# Patient Record
Sex: MALE | Race: WHITE | NOT HISPANIC OR LATINO | Employment: UNEMPLOYED | URBAN - METROPOLITAN AREA
[De-identification: names, ages, dates, MRNs, and addresses within clinical notes are randomized per-mention and may not be internally consistent; named-entity substitution may affect disease eponyms.]

---

## 2017-03-06 ENCOUNTER — HISTORICAL (OUTPATIENT)
Dept: LAB | Facility: HOSPITAL | Age: 52
End: 2017-03-06

## 2017-03-22 ENCOUNTER — HISTORICAL (OUTPATIENT)
Dept: LAB | Facility: HOSPITAL | Age: 52
End: 2017-03-22

## 2017-04-07 ENCOUNTER — HISTORICAL (OUTPATIENT)
Dept: RADIOLOGY | Facility: HOSPITAL | Age: 52
End: 2017-04-07

## 2017-04-21 ENCOUNTER — HISTORICAL (OUTPATIENT)
Dept: LAB | Facility: HOSPITAL | Age: 52
End: 2017-04-21

## 2017-04-24 ENCOUNTER — HISTORICAL (OUTPATIENT)
Dept: RADIOLOGY | Facility: HOSPITAL | Age: 52
End: 2017-04-24

## 2017-05-12 ENCOUNTER — HISTORICAL (OUTPATIENT)
Dept: RADIOLOGY | Facility: HOSPITAL | Age: 52
End: 2017-05-12

## 2017-05-12 LAB
ABS NEUT (OLG): 5 X10(3)/MCL (ref 2.1–9.2)
BASOPHILS # BLD AUTO: 0.1 X10(3)/MCL
BASOPHILS NFR BLD AUTO: 1 % (ref 0–2)
EOSINOPHIL # BLD AUTO: 0.5 X10(3)/MCL
EOSINOPHIL NFR BLD AUTO: 6 %
ERYTHROCYTE [DISTWIDTH] IN BLOOD BY AUTOMATED COUNT: 13.1 % (ref 11.5–17)
HCT VFR BLD AUTO: 39.6 % (ref 42–52)
HGB BLD-MCNC: 13.2 GM/DL (ref 14–18)
LYMPHOCYTES # BLD AUTO: 2.2 X10(3)/MCL
LYMPHOCYTES NFR BLD AUTO: 25 % (ref 13–40)
MCH RBC QN AUTO: 29.5 PG (ref 27–31)
MCHC RBC AUTO-ENTMCNC: 33.3 GM/DL (ref 33–36)
MCV RBC AUTO: 88.4 FL (ref 80–94)
MONOCYTES # BLD AUTO: 0.9 X10(3)/MCL
MONOCYTES NFR BLD AUTO: 10 % (ref 2–11)
NEUTROPHILS # BLD AUTO: 5 X10(3)/MCL (ref 2.1–9.2)
NEUTROPHILS NFR BLD AUTO: 57 % (ref 47–80)
PLATELET # BLD AUTO: 148 X10(3)/MCL (ref 130–400)
PMV BLD AUTO: 10.1 FL (ref 7.4–10.4)
RBC # BLD AUTO: 4.48 X10(6)/MCL (ref 4.7–6.1)
WBC # SPEC AUTO: 8.8 X10(3)/MCL (ref 4.5–11.5)

## 2017-07-13 ENCOUNTER — HISTORICAL (OUTPATIENT)
Dept: LAB | Facility: HOSPITAL | Age: 52
End: 2017-07-13

## 2017-07-13 LAB
BUN SERPL-MCNC: 19 MG/DL (ref 7–18)
CALCIUM SERPL-MCNC: 9.6 MG/DL (ref 8.5–10.1)
CHLORIDE SERPL-SCNC: 106 MMOL/L (ref 98–107)
CO2 SERPL-SCNC: 28.2 MMOL/L (ref 21–32)
CREAT SERPL-MCNC: 1.04 MG/DL (ref 0.6–1.3)
GLUCOSE SERPL-MCNC: 116 MG/DL (ref 74–106)
POTASSIUM SERPL-SCNC: 4.5 MMOL/L (ref 3.5–5.1)
SODIUM SERPL-SCNC: 143 MMOL/L (ref 136–145)

## 2017-07-28 ENCOUNTER — HISTORICAL (OUTPATIENT)
Dept: LAB | Facility: HOSPITAL | Age: 52
End: 2017-07-28

## 2017-07-28 ENCOUNTER — HISTORICAL (OUTPATIENT)
Dept: RESPIRATORY THERAPY | Facility: HOSPITAL | Age: 52
End: 2017-07-28

## 2017-07-28 LAB
ABS NEUT (OLG): 6.1 X10(3)/MCL (ref 2.1–9.2)
BASOPHILS # BLD AUTO: 0.1 X10(3)/MCL
BASOPHILS NFR BLD AUTO: 1 % (ref 0–2)
EOSINOPHIL # BLD AUTO: 0.5 X10(3)/MCL
EOSINOPHIL NFR BLD AUTO: 5 %
ERYTHROCYTE [DISTWIDTH] IN BLOOD BY AUTOMATED COUNT: 13.2 % (ref 11.5–17)
HCT VFR BLD AUTO: 40.8 % (ref 42–52)
HGB BLD-MCNC: 13.5 GM/DL (ref 14–18)
LYMPHOCYTES # BLD AUTO: 2.7 X10(3)/MCL
LYMPHOCYTES NFR BLD AUTO: 26 % (ref 13–40)
MCH RBC QN AUTO: 28.2 PG (ref 27–31)
MCHC RBC AUTO-ENTMCNC: 33.1 GM/DL (ref 33–36)
MCV RBC AUTO: 85.3 FL (ref 80–94)
MONOCYTES # BLD AUTO: 1.1 X10(3)/MCL
MONOCYTES NFR BLD AUTO: 10 % (ref 2–11)
NEUTROPHILS # BLD AUTO: 6.1 X10(3)/MCL (ref 2.1–9.2)
NEUTROPHILS NFR BLD AUTO: 58 % (ref 47–80)
PLATELET # BLD AUTO: 204 X10(3)/MCL (ref 130–400)
PMV BLD AUTO: 9.7 FL (ref 7.4–10.4)
RBC # BLD AUTO: 4.79 X10(6)/MCL (ref 4.7–6.1)
WBC # SPEC AUTO: 10.5 X10(3)/MCL (ref 4.5–11.5)

## 2017-09-20 ENCOUNTER — HISTORICAL (OUTPATIENT)
Dept: RESPIRATORY THERAPY | Facility: HOSPITAL | Age: 52
End: 2017-09-20

## 2017-11-08 ENCOUNTER — HISTORICAL (OUTPATIENT)
Dept: LAB | Facility: HOSPITAL | Age: 52
End: 2017-11-08

## 2017-11-08 LAB — D DIMER PPP IA.FEU-MCNC: 0.56 MG/L (ref 0.19–0.5)

## 2017-11-10 ENCOUNTER — HISTORICAL (OUTPATIENT)
Dept: RADIOLOGY | Facility: HOSPITAL | Age: 52
End: 2017-11-10

## 2018-03-09 ENCOUNTER — HISTORICAL (OUTPATIENT)
Dept: INTERNAL MEDICINE | Facility: CLINIC | Age: 53
End: 2018-03-09

## 2018-03-11 LAB
FINAL CULTURE: NORMAL
GRAM STN SPEC: NORMAL

## 2018-04-05 ENCOUNTER — HISTORICAL (OUTPATIENT)
Dept: LAB | Facility: HOSPITAL | Age: 53
End: 2018-04-05

## 2018-04-26 ENCOUNTER — HISTORICAL (OUTPATIENT)
Dept: INTERNAL MEDICINE | Facility: CLINIC | Age: 53
End: 2018-04-26

## 2018-05-04 ENCOUNTER — HISTORICAL (OUTPATIENT)
Dept: RADIOLOGY | Facility: HOSPITAL | Age: 53
End: 2018-05-04

## 2018-05-07 ENCOUNTER — HISTORICAL (OUTPATIENT)
Dept: LAB | Facility: HOSPITAL | Age: 53
End: 2018-05-07

## 2018-05-07 LAB
ALBUMIN SERPL-MCNC: 3.9 GM/DL (ref 3.4–5)
ALP SERPL-CCNC: 61 UNIT/L (ref 46–116)
ALT SERPL-CCNC: 72 UNIT/L (ref 12–78)
AST SERPL-CCNC: 22 UNIT/L (ref 15–37)
BILIRUB SERPL-MCNC: 1.2 MG/DL (ref 0.2–1)
BILIRUBIN DIRECT+TOT PNL SERPL-MCNC: 0.24 MG/DL (ref 0–0.2)
BILIRUBIN DIRECT+TOT PNL SERPL-MCNC: 0.96 MG/DL (ref 0–0.8)
CHOLEST SERPL-MCNC: 129 MG/DL (ref 0–200)
CHOLEST/HDLC SERPL: 3 {RATIO} (ref 0–5)
HDLC SERPL-MCNC: 43 MG/DL (ref 40–60)
LDLC SERPL CALC-MCNC: 64 MG/DL (ref 0–129)
PROT SERPL-MCNC: 7.1 GM/DL (ref 6.4–8.2)
TRIGL SERPL-MCNC: 108 MG/DL
VLDLC SERPL CALC-MCNC: 22 MG/DL

## 2018-05-14 ENCOUNTER — HISTORICAL (OUTPATIENT)
Dept: PHYSICAL THERAPY | Facility: HOSPITAL | Age: 53
End: 2018-05-14

## 2018-06-05 ENCOUNTER — HISTORICAL (OUTPATIENT)
Dept: PHYSICAL THERAPY | Facility: HOSPITAL | Age: 53
End: 2018-06-05

## 2018-06-08 ENCOUNTER — HISTORICAL (OUTPATIENT)
Dept: PHYSICAL THERAPY | Facility: HOSPITAL | Age: 53
End: 2018-06-08

## 2018-06-08 ENCOUNTER — HISTORICAL (OUTPATIENT)
Dept: RADIOLOGY | Facility: HOSPITAL | Age: 53
End: 2018-06-08

## 2018-06-12 ENCOUNTER — HISTORICAL (OUTPATIENT)
Dept: PHYSICAL THERAPY | Facility: HOSPITAL | Age: 53
End: 2018-06-12

## 2018-06-14 ENCOUNTER — HISTORICAL (OUTPATIENT)
Dept: PHYSICAL THERAPY | Facility: HOSPITAL | Age: 53
End: 2018-06-14

## 2018-06-19 ENCOUNTER — HISTORICAL (OUTPATIENT)
Dept: PHYSICAL THERAPY | Facility: HOSPITAL | Age: 53
End: 2018-06-19

## 2018-06-20 ENCOUNTER — HISTORICAL (OUTPATIENT)
Dept: RADIOLOGY | Facility: HOSPITAL | Age: 53
End: 2018-06-20

## 2018-06-21 ENCOUNTER — HISTORICAL (OUTPATIENT)
Dept: PHYSICAL THERAPY | Facility: HOSPITAL | Age: 53
End: 2018-06-21

## 2018-06-22 LAB
COLOR STL: NORMAL
CONSISTENCY STL: NORMAL
HEMOCCULT SP1 STL QL: NEGATIVE

## 2018-06-23 LAB
COLOR STL: NORMAL
CONSISTENCY STL: NORMAL
HEMOCCULT SP2 STL QL: NEGATIVE

## 2018-06-24 LAB
COLOR STL: NORMAL
CONSISTENCY STL: NORMAL

## 2018-06-25 ENCOUNTER — HISTORICAL (OUTPATIENT)
Dept: LAB | Facility: HOSPITAL | Age: 53
End: 2018-06-25

## 2018-06-26 ENCOUNTER — HISTORICAL (OUTPATIENT)
Dept: PHYSICAL THERAPY | Facility: HOSPITAL | Age: 53
End: 2018-06-26

## 2018-06-27 ENCOUNTER — HISTORICAL (OUTPATIENT)
Dept: WOUND CARE | Facility: HOSPITAL | Age: 53
End: 2018-06-27

## 2018-06-29 ENCOUNTER — HISTORICAL (OUTPATIENT)
Dept: WOUND CARE | Facility: HOSPITAL | Age: 53
End: 2018-06-29

## 2018-07-03 ENCOUNTER — HISTORICAL (OUTPATIENT)
Dept: WOUND CARE | Facility: HOSPITAL | Age: 53
End: 2018-07-03

## 2018-07-10 ENCOUNTER — HISTORICAL (OUTPATIENT)
Dept: LAB | Facility: HOSPITAL | Age: 53
End: 2018-07-10

## 2018-07-10 ENCOUNTER — HISTORICAL (OUTPATIENT)
Dept: WOUND CARE | Facility: HOSPITAL | Age: 53
End: 2018-07-10

## 2018-07-10 LAB
BUN SERPL-MCNC: 15.8 MG/DL (ref 7–18)
CALCIUM SERPL-MCNC: 9 MG/DL (ref 8.5–10.1)
CHLORIDE SERPL-SCNC: 107 MMOL/L (ref 98–107)
CO2 SERPL-SCNC: 28.1 MMOL/L (ref 21–32)
CREAT SERPL-MCNC: 1.18 MG/DL (ref 0.6–1.3)
CREAT/UREA NIT SERPL: 13
ERYTHROCYTE [DISTWIDTH] IN BLOOD BY AUTOMATED COUNT: 13.3 % (ref 11.5–17)
GLUCOSE SERPL-MCNC: 103 MG/DL (ref 74–106)
HCT VFR BLD AUTO: 37.8 % (ref 42–52)
HGB BLD-MCNC: 12.8 GM/DL (ref 14–18)
MCH RBC QN AUTO: 29.4 PG (ref 27–31)
MCHC RBC AUTO-ENTMCNC: 33.8 GM/DL (ref 33–36)
MCV RBC AUTO: 87.2 FL (ref 80–94)
PLATELET # BLD AUTO: 153 X10(3)/MCL (ref 130–400)
PMV BLD AUTO: 10.1 FL (ref 7.4–10.4)
POTASSIUM SERPL-SCNC: 4.1 MMOL/L (ref 3.5–5.1)
RBC # BLD AUTO: 4.34 X10(6)/MCL (ref 4.7–6.1)
SODIUM SERPL-SCNC: 141 MMOL/L (ref 136–145)
WBC # SPEC AUTO: 8.1 X10(3)/MCL (ref 4.5–11.5)

## 2018-07-12 ENCOUNTER — HISTORICAL (OUTPATIENT)
Dept: WOUND CARE | Facility: HOSPITAL | Age: 53
End: 2018-07-12

## 2018-07-17 ENCOUNTER — HISTORICAL (OUTPATIENT)
Dept: CARDIOLOGY | Facility: HOSPITAL | Age: 53
End: 2018-07-17

## 2018-07-17 LAB
APTT PPP: 32.3 SECOND(S) (ref 24.8–36.9)
INR PPP: 1.02 (ref 0–1.27)
PROTHROMBIN TIME: 13.7 SECOND(S) (ref 12.2–14.7)

## 2018-07-19 ENCOUNTER — HISTORICAL (OUTPATIENT)
Dept: WOUND CARE | Facility: HOSPITAL | Age: 53
End: 2018-07-19

## 2018-07-26 ENCOUNTER — HISTORICAL (OUTPATIENT)
Dept: WOUND CARE | Facility: HOSPITAL | Age: 53
End: 2018-07-26

## 2018-07-30 ENCOUNTER — HISTORICAL (OUTPATIENT)
Dept: RADIOLOGY | Facility: HOSPITAL | Age: 53
End: 2018-07-30

## 2018-08-02 ENCOUNTER — HISTORICAL (OUTPATIENT)
Dept: WOUND CARE | Facility: HOSPITAL | Age: 53
End: 2018-08-02

## 2018-08-09 ENCOUNTER — HISTORICAL (OUTPATIENT)
Dept: WOUND CARE | Facility: HOSPITAL | Age: 53
End: 2018-08-09

## 2018-08-16 ENCOUNTER — HISTORICAL (OUTPATIENT)
Dept: WOUND CARE | Facility: HOSPITAL | Age: 53
End: 2018-08-16

## 2018-08-22 ENCOUNTER — HISTORICAL (OUTPATIENT)
Dept: PHYSICAL THERAPY | Facility: HOSPITAL | Age: 53
End: 2018-08-22

## 2018-08-23 ENCOUNTER — HISTORICAL (OUTPATIENT)
Dept: WOUND CARE | Facility: HOSPITAL | Age: 53
End: 2018-08-23

## 2018-09-04 ENCOUNTER — HISTORICAL (OUTPATIENT)
Dept: PHYSICAL THERAPY | Facility: HOSPITAL | Age: 53
End: 2018-09-04

## 2018-09-06 ENCOUNTER — HISTORICAL (OUTPATIENT)
Dept: PHYSICAL THERAPY | Facility: HOSPITAL | Age: 53
End: 2018-09-06

## 2018-09-11 ENCOUNTER — HISTORICAL (OUTPATIENT)
Dept: PHYSICAL THERAPY | Facility: HOSPITAL | Age: 53
End: 2018-09-11

## 2018-12-04 ENCOUNTER — HISTORICAL (OUTPATIENT)
Dept: RADIOLOGY | Facility: HOSPITAL | Age: 53
End: 2018-12-04

## 2018-12-04 LAB
ALBUMIN SERPL-MCNC: 3.6 GM/DL (ref 3.4–5)
ALBUMIN/GLOB SERPL: 1.1 RATIO (ref 1.1–2)
ALP SERPL-CCNC: 54 UNIT/L (ref 46–116)
ALT SERPL-CCNC: 36 UNIT/L (ref 12–78)
AST SERPL-CCNC: 19 UNIT/L (ref 15–37)
BILIRUB SERPL-MCNC: 0.9 MG/DL (ref 0.2–1)
BILIRUBIN DIRECT+TOT PNL SERPL-MCNC: 0.16 MG/DL (ref 0–0.2)
BILIRUBIN DIRECT+TOT PNL SERPL-MCNC: 0.74 MG/DL (ref 0–0.8)
BUN SERPL-MCNC: 15.6 MG/DL (ref 7–18)
CALCIUM SERPL-MCNC: 9 MG/DL (ref 8.5–10.1)
CHLORIDE SERPL-SCNC: 107 MMOL/L (ref 98–107)
CHOLEST SERPL-MCNC: 265 MG/DL (ref 0–200)
CHOLEST/HDLC SERPL: 5.5 {RATIO} (ref 0–5)
CO2 SERPL-SCNC: 26.6 MMOL/L (ref 21–32)
CREAT SERPL-MCNC: 1.13 MG/DL (ref 0.6–1.3)
GLOBULIN SER-MCNC: 3.2 GM/DL (ref 2.4–3.5)
GLUCOSE SERPL-MCNC: 105 MG/DL (ref 74–106)
HDLC SERPL-MCNC: 48 MG/DL (ref 40–60)
LDLC SERPL CALC-MCNC: 188 MG/DL (ref 0–129)
POTASSIUM SERPL-SCNC: 4.1 MMOL/L (ref 3.5–5.1)
PROT SERPL-MCNC: 6.8 GM/DL (ref 6.4–8.2)
SODIUM SERPL-SCNC: 142 MMOL/L (ref 136–145)
TRIGL SERPL-MCNC: 147 MG/DL
URATE SERPL-MCNC: 7 MG/DL (ref 3.4–7)
VLDLC SERPL CALC-MCNC: 29 MG/DL

## 2019-01-08 ENCOUNTER — HISTORICAL (OUTPATIENT)
Dept: LAB | Facility: HOSPITAL | Age: 54
End: 2019-01-08

## 2019-01-08 LAB
ABS NEUT (OLG): 5.02 X10(3)/MCL (ref 2.1–9.2)
BASOPHILS # BLD AUTO: 0.06 X10(3)/MCL
BASOPHILS NFR BLD AUTO: 1 %
EOSINOPHIL # BLD AUTO: 0.34 X10(3)/MCL
EOSINOPHIL NFR BLD AUTO: 4 %
ERYTHROCYTE [DISTWIDTH] IN BLOOD BY AUTOMATED COUNT: 13.2 % (ref 11.5–14.5)
HCT VFR BLD AUTO: 41.2 % (ref 40–51)
HGB BLD-MCNC: 13.8 GM/DL (ref 13.5–17.5)
HIV 1+2 AB+HIV1 P24 AG SERPL QL IA: NONREACTIVE
IMM GRANULOCYTES # BLD AUTO: 0.05 10*3/UL
IMM GRANULOCYTES NFR BLD AUTO: 1 %
LYMPHOCYTES # BLD AUTO: 2.03 X10(3)/MCL
LYMPHOCYTES NFR BLD AUTO: 25 % (ref 13–40)
MCH RBC QN AUTO: 29.2 PG (ref 26–34)
MCHC RBC AUTO-ENTMCNC: 33.5 GM/DL (ref 31–37)
MCV RBC AUTO: 87.1 FL (ref 80–100)
MONOCYTES # BLD AUTO: 0.75 X10(3)/MCL
MONOCYTES NFR BLD AUTO: 9 % (ref 4–12)
NEUTROPHILS # BLD AUTO: 5.02 X10(3)/MCL
NEUTROPHILS NFR BLD AUTO: 61 X10(3)/MCL
PLATELET # BLD AUTO: 165 X10(3)/MCL (ref 130–400)
PMV BLD AUTO: 11.3 FL (ref 7.4–10.4)
RBC # BLD AUTO: 4.73 X10(6)/MCL (ref 4.5–5.9)
WBC # SPEC AUTO: 8.2 X10(3)/MCL (ref 4.5–11)

## 2019-02-07 ENCOUNTER — HISTORICAL (OUTPATIENT)
Dept: RADIOLOGY | Facility: HOSPITAL | Age: 54
End: 2019-02-07

## 2019-02-14 ENCOUNTER — HISTORICAL (OUTPATIENT)
Dept: LAB | Facility: HOSPITAL | Age: 54
End: 2019-02-14

## 2019-02-14 LAB
CRP SERPL-MCNC: <0.2 MG/DL (ref 0–0.9)
ERYTHROCYTE [SEDIMENTATION RATE] IN BLOOD: 3 MM/HR (ref 0–15)
RHEUMATOID FACT SERPL-ACNC: <10 IU/ML (ref 0–15)

## 2019-03-20 ENCOUNTER — HISTORICAL (OUTPATIENT)
Dept: LAB | Facility: HOSPITAL | Age: 54
End: 2019-03-20

## 2019-03-20 LAB
ALBUMIN SERPL-MCNC: 3.7 GM/DL (ref 3.4–5)
ALP SERPL-CCNC: 56 UNIT/L (ref 46–116)
ALT SERPL-CCNC: 36 UNIT/L (ref 12–78)
AST SERPL-CCNC: 15 UNIT/L (ref 15–37)
BILIRUB SERPL-MCNC: 0.6 MG/DL (ref 0.2–1)
BILIRUBIN DIRECT+TOT PNL SERPL-MCNC: 0.11 MG/DL (ref 0–0.2)
BILIRUBIN DIRECT+TOT PNL SERPL-MCNC: 0.49 MG/DL (ref 0–0.8)
CHOLEST SERPL-MCNC: 263 MG/DL (ref 0–200)
CHOLEST/HDLC SERPL: 6.7 {RATIO} (ref 0–5)
HDLC SERPL-MCNC: 39 MG/DL (ref 40–60)
LDLC SERPL CALC-MCNC: 169 MG/DL (ref 0–129)
PROT SERPL-MCNC: 6.9 GM/DL (ref 6.4–8.2)
TRIGL SERPL-MCNC: 273 MG/DL
VLDLC SERPL CALC-MCNC: 55 MG/DL

## 2019-04-28 ENCOUNTER — HISTORICAL (OUTPATIENT)
Dept: SLEEP MEDICINE | Facility: HOSPITAL | Age: 54
End: 2019-04-28

## 2019-05-18 ENCOUNTER — HISTORICAL (OUTPATIENT)
Dept: SLEEP MEDICINE | Facility: HOSPITAL | Age: 54
End: 2019-05-18

## 2019-08-08 ENCOUNTER — HISTORICAL (OUTPATIENT)
Dept: LAB | Facility: HOSPITAL | Age: 54
End: 2019-08-08

## 2019-08-08 LAB
ALBUMIN SERPL-MCNC: 3.9 GM/DL (ref 3.4–5)
ALP SERPL-CCNC: 60 UNIT/L (ref 46–116)
ALT SERPL-CCNC: 38 UNIT/L (ref 12–78)
AST SERPL-CCNC: 19 UNIT/L (ref 15–37)
BILIRUB SERPL-MCNC: 0.7 MG/DL (ref 0.2–1)
BILIRUBIN DIRECT+TOT PNL SERPL-MCNC: 0.11 MG/DL (ref 0–0.2)
BILIRUBIN DIRECT+TOT PNL SERPL-MCNC: 0.59 MG/DL (ref 0–0.8)
CHOLEST SERPL-MCNC: 150 MG/DL (ref 0–200)
CHOLEST/HDLC SERPL: 3.3 {RATIO} (ref 0–5)
HDLC SERPL-MCNC: 46 MG/DL (ref 40–60)
LDLC SERPL CALC-MCNC: 66 MG/DL (ref 0–129)
PROT SERPL-MCNC: 7.2 GM/DL (ref 6.4–8.2)
TRIGL SERPL-MCNC: 190 MG/DL
VLDLC SERPL CALC-MCNC: 38 MG/DL

## 2019-08-21 ENCOUNTER — HISTORICAL (OUTPATIENT)
Dept: RADIOLOGY | Facility: HOSPITAL | Age: 54
End: 2019-08-21

## 2019-11-05 ENCOUNTER — HISTORICAL (OUTPATIENT)
Dept: LAB | Facility: HOSPITAL | Age: 54
End: 2019-11-05

## 2019-11-05 LAB
ALBUMIN SERPL-MCNC: 4 GM/DL (ref 3.4–5)
ALBUMIN/GLOB SERPL: 1.1 RATIO (ref 1.1–2)
ALP SERPL-CCNC: 62 UNIT/L (ref 46–116)
ALT SERPL-CCNC: 33 UNIT/L (ref 12–78)
APPEARANCE, UA: CLEAR
AST SERPL-CCNC: 17 UNIT/L (ref 15–37)
BACTERIA SPEC CULT: NORMAL
BILIRUB SERPL-MCNC: 1.3 MG/DL (ref 0.2–1)
BILIRUB UR QL STRIP: NEGATIVE
BILIRUBIN DIRECT+TOT PNL SERPL-MCNC: 0.23 MG/DL (ref 0–0.2)
BILIRUBIN DIRECT+TOT PNL SERPL-MCNC: 1.07 MG/DL (ref 0–0.8)
BUN SERPL-MCNC: 17.1 MG/DL (ref 7–18)
CALCIUM SERPL-MCNC: 9.3 MG/DL (ref 8.5–10.1)
CHLORIDE SERPL-SCNC: 102 MMOL/L (ref 98–107)
CHOLEST SERPL-MCNC: 298 MG/DL (ref 0–200)
CHOLEST/HDLC SERPL: 7.3 {RATIO} (ref 0–5)
CO2 SERPL-SCNC: 28.4 MMOL/L (ref 21–32)
COLOR UR: YELLOW
CREAT SERPL-MCNC: 1.07 MG/DL (ref 0.6–1.3)
ERYTHROCYTE [DISTWIDTH] IN BLOOD BY AUTOMATED COUNT: 12.8 % (ref 11.5–17)
GLOBULIN SER-MCNC: 3.5 GM/DL (ref 2.4–3.5)
GLUCOSE (UA): NEGATIVE
GLUCOSE SERPL-MCNC: 100 MG/DL (ref 74–106)
HCT VFR BLD AUTO: 43.8 % (ref 42–52)
HDLC SERPL-MCNC: 41 MG/DL (ref 40–60)
HGB BLD-MCNC: 14.3 GM/DL (ref 14–18)
HGB UR QL STRIP: NEGATIVE
KETONES UR QL STRIP: NEGATIVE
LDLC SERPL CALC-MCNC: 181 MG/DL (ref 0–129)
LEUKOCYTE ESTERASE UR QL STRIP: NEGATIVE
MCH RBC QN AUTO: 29.1 PG (ref 27–31)
MCHC RBC AUTO-ENTMCNC: 32.6 GM/DL (ref 33–36)
MCV RBC AUTO: 89 FL (ref 80–94)
NITRITE UR QL STRIP: NEGATIVE
PH UR STRIP: 7 [PH] (ref 5–9)
PLATELET # BLD AUTO: 193 X10(3)/MCL (ref 130–400)
PMV BLD AUTO: 11.2 FL (ref 9.4–12.4)
POTASSIUM SERPL-SCNC: 4.6 MMOL/L (ref 3.5–5.1)
PROT SERPL-MCNC: 7.5 GM/DL (ref 6.4–8.2)
PROT UR QL STRIP: NEGATIVE
PSA SERPL-MCNC: 1.68 NG/ML (ref 0–4)
RBC # BLD AUTO: 4.92 X10(6)/MCL (ref 4.7–6.1)
RBC #/AREA URNS HPF: NORMAL /[HPF]
SODIUM SERPL-SCNC: 139 MMOL/L (ref 136–145)
SP GR UR STRIP: 1.02 (ref 1–1.03)
SQUAMOUS EPITHELIAL, UA: NORMAL
TRIGL SERPL-MCNC: 381 MG/DL
TSH SERPL-ACNC: 0.95 MIU/ML (ref 0.36–3.74)
URATE SERPL-MCNC: 6.3 MG/DL (ref 3.4–7)
UROBILINOGEN UR STRIP-ACNC: 0.2
VLDLC SERPL CALC-MCNC: 76 MG/DL
WBC # SPEC AUTO: 8.1 X10(3)/MCL (ref 4.5–11.5)
WBC #/AREA URNS HPF: NORMAL /[HPF]

## 2020-05-22 ENCOUNTER — HISTORICAL (OUTPATIENT)
Dept: LAB | Facility: HOSPITAL | Age: 55
End: 2020-05-22

## 2020-05-22 LAB
ALBUMIN SERPL-MCNC: 3.9 GM/DL (ref 3.4–5)
ALP SERPL-CCNC: 51 UNIT/L (ref 46–116)
ALT SERPL-CCNC: 28 UNIT/L (ref 12–78)
AST SERPL-CCNC: 16 UNIT/L (ref 15–37)
BILIRUB SERPL-MCNC: 0.7 MG/DL (ref 0.2–1)
BILIRUBIN DIRECT+TOT PNL SERPL-MCNC: 0.18 MG/DL (ref 0–0.2)
BILIRUBIN DIRECT+TOT PNL SERPL-MCNC: 0.52 MG/DL (ref 0–0.8)
CHOLEST SERPL-MCNC: 149 MG/DL (ref 0–200)
CHOLEST/HDLC SERPL: 3.3 {RATIO} (ref 0–5)
HDLC SERPL-MCNC: 45 MG/DL (ref 40–60)
LDLC SERPL CALC-MCNC: 73 MG/DL (ref 0–129)
PROT SERPL-MCNC: 6.8 GM/DL (ref 6.4–8.2)
TRIGL SERPL-MCNC: 156 MG/DL
VLDLC SERPL CALC-MCNC: 31 MG/DL

## 2020-07-06 ENCOUNTER — HISTORICAL (OUTPATIENT)
Dept: LAB | Facility: HOSPITAL | Age: 55
End: 2020-07-06

## 2020-09-16 ENCOUNTER — HISTORICAL (OUTPATIENT)
Dept: LAB | Facility: HOSPITAL | Age: 55
End: 2020-09-16

## 2020-09-16 LAB
ALBUMIN SERPL-MCNC: 3.9 GM/DL (ref 3.4–5)
ALP SERPL-CCNC: 50 UNIT/L (ref 46–116)
ALT SERPL-CCNC: 35 UNIT/L (ref 12–78)
APPEARANCE, UA: CLEAR
AST SERPL-CCNC: 17 UNIT/L (ref 15–37)
BACTERIA SPEC CULT: NORMAL
BILIRUB SERPL-MCNC: 1.2 MG/DL (ref 0.2–1)
BILIRUB UR QL STRIP: NEGATIVE
BILIRUBIN DIRECT+TOT PNL SERPL-MCNC: 0.26 MG/DL (ref 0–0.2)
BILIRUBIN DIRECT+TOT PNL SERPL-MCNC: 0.94 MG/DL (ref 0–0.8)
COLOR UR: YELLOW
GLUCOSE (UA): NEGATIVE
HCV AB SERPL QL IA: NONREACTIVE
HGB UR QL STRIP: NEGATIVE
KETONES UR QL STRIP: NEGATIVE
LEUKOCYTE ESTERASE UR QL STRIP: NEGATIVE
NITRITE UR QL STRIP: NEGATIVE
PH UR STRIP: 5.5 [PH] (ref 5–9)
PROT SERPL-MCNC: 6.9 GM/DL (ref 6.4–8.2)
PROT UR QL STRIP: NEGATIVE
RBC #/AREA URNS HPF: NORMAL /[HPF]
SP GR UR STRIP: 1.02 (ref 1–1.03)
SQUAMOUS EPITHELIAL, UA: NORMAL
UROBILINOGEN UR STRIP-ACNC: 0.2
WBC #/AREA URNS HPF: NORMAL /[HPF]

## 2021-01-05 ENCOUNTER — HISTORICAL (OUTPATIENT)
Dept: LAB | Facility: HOSPITAL | Age: 56
End: 2021-01-05

## 2021-01-05 LAB
ALBUMIN SERPL-MCNC: 3.9 GM/DL (ref 3.5–5)
ALBUMIN/GLOB SERPL: 1.5 RATIO (ref 1.1–2)
ALP SERPL-CCNC: 52 UNIT/L (ref 40–150)
ALT SERPL-CCNC: 23 UNIT/L (ref 0–55)
APPEARANCE, UA: CLEAR
AST SERPL-CCNC: 15 UNIT/L (ref 5–34)
BACTERIA SPEC CULT: NORMAL
BILIRUB SERPL-MCNC: 1.5 MG/DL
BILIRUB UR QL STRIP: NEGATIVE
BILIRUBIN DIRECT+TOT PNL SERPL-MCNC: 0.4 MG/DL (ref 0–0.5)
BILIRUBIN DIRECT+TOT PNL SERPL-MCNC: 1.1 MG/DL (ref 0–0.8)
BUN SERPL-MCNC: 16.8 MG/DL (ref 8.4–25.7)
CALCIUM SERPL-MCNC: 8.8 MG/DL (ref 8.4–10.2)
CHLORIDE SERPL-SCNC: 101 MMOL/L (ref 98–107)
CHOLEST SERPL-MCNC: 214 MG/DL
CHOLEST/HDLC SERPL: 5 {RATIO} (ref 0–5)
CO2 SERPL-SCNC: 29 MMOL/L (ref 22–29)
COLOR UR: YELLOW
CREAT SERPL-MCNC: 1.07 MG/DL (ref 0.73–1.18)
ERYTHROCYTE [DISTWIDTH] IN BLOOD BY AUTOMATED COUNT: 11.7 % (ref 11.5–17)
GLOBULIN SER-MCNC: 2.6 GM/DL (ref 2.4–3.5)
GLUCOSE (UA): NEGATIVE
GLUCOSE SERPL-MCNC: 97 MG/DL (ref 74–100)
HCT VFR BLD AUTO: 44.2 % (ref 42–52)
HDLC SERPL-MCNC: 41 MG/DL (ref 35–60)
HGB BLD-MCNC: 14.7 GM/DL (ref 14–18)
HGB UR QL STRIP: NEGATIVE
KETONES UR QL STRIP: NEGATIVE
LDLC SERPL CALC-MCNC: 134 MG/DL (ref 50–140)
LEUKOCYTE ESTERASE UR QL STRIP: NEGATIVE
MCH RBC QN AUTO: 29.8 PG (ref 27–31)
MCHC RBC AUTO-ENTMCNC: 33.3 GM/DL (ref 33–36)
MCV RBC AUTO: 89.7 FL (ref 80–94)
NITRITE UR QL STRIP: NEGATIVE
PH UR STRIP: 6 [PH] (ref 5–9)
PLATELET # BLD AUTO: 177 X10(3)/MCL (ref 130–400)
PMV BLD AUTO: 10.8 FL (ref 9.4–12.4)
POTASSIUM SERPL-SCNC: 3.9 MMOL/L (ref 3.5–5.1)
PROT SERPL-MCNC: 6.5 GM/DL (ref 6.4–8.3)
PROT UR QL STRIP: NEGATIVE
PSA SERPL-MCNC: 1.78 NG/ML
RBC # BLD AUTO: 4.93 X10(6)/MCL (ref 4.7–6.1)
RBC #/AREA URNS HPF: NORMAL /HPF
SODIUM SERPL-SCNC: 138 MMOL/L (ref 136–145)
SP GR UR STRIP: >=1.03 (ref 1–1.03)
SQUAMOUS EPITHELIAL, UA: NORMAL
TRIGL SERPL-MCNC: 195 MG/DL (ref 34–140)
TSH SERPL-ACNC: 0.51 UIU/ML (ref 0.35–4.94)
URATE SERPL-MCNC: 8.7 MG/DL (ref 3.5–7.2)
UROBILINOGEN UR STRIP-ACNC: 0.2
VLDLC SERPL CALC-MCNC: 39 MG/DL
WBC # SPEC AUTO: 10.9 X10(3)/MCL (ref 4.5–11.5)
WBC #/AREA URNS HPF: NORMAL /[HPF]

## 2021-09-10 ENCOUNTER — HISTORICAL (OUTPATIENT)
Dept: LAB | Facility: HOSPITAL | Age: 56
End: 2021-09-10

## 2021-09-10 LAB
ALBUMIN SERPL-MCNC: 3.9 GM/DL (ref 3.5–5)
ALLERGEN ALTERNARIA ALTERNATA IGE (OLG): <0.1 KU/L
ALLERGEN ASPERGILLUS FUMIGATUS IGE (OLG): <0.1 KU/L
ALLERGEN BERMUDA GRASS IGE (OLG): <0.1 KU/L
ALLERGEN BOXELDER MAPLE TREE IGE (OLG): <0.1 KU/L
ALLERGEN CAT DANDER IGE (OLG): <0.1 KU/L
ALLERGEN CLADOSPORIUM HERBARUM IGE (OLG): <0.1 KU/L
ALLERGEN COCKROACH GERMAN IGE (OLG): <0.1 KU/L
ALLERGEN COMMON RAGWEED IGE (OLG): <0.1 KU/L
ALLERGEN DOG DANDER IGE (OLG): <0.1 KU/L
ALLERGEN DUST MITE (D. PTERONYSSINUS) IGE (OLG): <0.1 KU/L
ALLERGEN DUST MITE (D.FARINAE) IGE (OLG): <0.1 KU/L
ALLERGEN ELM TREE IGE (OLG): <0.1 KU/L
ALLERGEN HORSE DANDER IGE (OLG): <0.1 KU/L
ALLERGEN MOUNTAIN JUNIPER TREE IGE (OLG): <0.1 KU/L
ALLERGEN MOUSE URINE PROTEINS IGE (OLG): <0.1 KU/L
ALLERGEN MUCOR RACEMOSUS IGE (OLG): <0.1 KU/L
ALLERGEN OAK TREE IGE (OLG): <0.1 KU/L
ALLERGEN PECAN HICKORY TREE IGE (OLG): <0.1 KU/L
ALLERGEN PENICILLIUM CHRYSOGENUM IGE (OLG): <0.1 KU/L
ALLERGEN PIGWEED IGE (OLG): <0.1 KU/L
ALLERGEN ROUGH MARSH ELDER IGE (OLG): <0.1 KU/L
ALLERGEN SILVER BIRCH TREE IGE (OLG): <0.1 KU/L
ALLERGEN TIMOTHY GRASS IGE (OLG): <0.1 KU/L
ALLERGEN WALNUT TREE IGE (OLG): <0.1 KU/L
ALP SERPL-CCNC: 58 UNIT/L (ref 40–150)
ALT SERPL-CCNC: 50 UNIT/L (ref 0–55)
AST SERPL-CCNC: 20 UNIT/L (ref 5–34)
BILIRUB SERPL-MCNC: 0.9 MG/DL
BILIRUBIN DIRECT+TOT PNL SERPL-MCNC: 0.2 MG/DL (ref 0–0.5)
BILIRUBIN DIRECT+TOT PNL SERPL-MCNC: 0.7 MG/DL (ref 0–0.8)
CHOLEST SERPL-MCNC: 264 MG/DL
CHOLEST/HDLC SERPL: 6 {RATIO} (ref 0–5)
HDLC SERPL-MCNC: 46 MG/DL (ref 35–60)
LDLC SERPL CALC-MCNC: 134 MG/DL (ref 50–140)
PROT SERPL-MCNC: 6.8 GM/DL (ref 6.4–8.3)
TRIGL SERPL-MCNC: 422 MG/DL (ref 34–140)
VLDLC SERPL CALC-MCNC: 84 MG/DL

## 2021-09-28 ENCOUNTER — HISTORICAL (OUTPATIENT)
Dept: PHYSICAL THERAPY | Facility: HOSPITAL | Age: 56
End: 2021-09-28

## 2021-10-12 ENCOUNTER — HISTORICAL (OUTPATIENT)
Dept: PHYSICAL THERAPY | Facility: HOSPITAL | Age: 56
End: 2021-10-12

## 2021-10-13 ENCOUNTER — HISTORICAL (OUTPATIENT)
Dept: PHYSICAL THERAPY | Facility: HOSPITAL | Age: 56
End: 2021-10-13

## 2021-10-15 ENCOUNTER — HISTORICAL (OUTPATIENT)
Dept: PHYSICAL THERAPY | Facility: HOSPITAL | Age: 56
End: 2021-10-15

## 2021-10-18 ENCOUNTER — HISTORICAL (OUTPATIENT)
Dept: PHYSICAL THERAPY | Facility: HOSPITAL | Age: 56
End: 2021-10-18

## 2021-10-22 ENCOUNTER — HISTORICAL (OUTPATIENT)
Dept: PHYSICAL THERAPY | Facility: HOSPITAL | Age: 56
End: 2021-10-22

## 2021-10-27 ENCOUNTER — HISTORICAL (OUTPATIENT)
Dept: PHYSICAL THERAPY | Facility: HOSPITAL | Age: 56
End: 2021-10-27

## 2021-10-29 ENCOUNTER — HISTORICAL (OUTPATIENT)
Dept: PHYSICAL THERAPY | Facility: HOSPITAL | Age: 56
End: 2021-10-29

## 2021-11-15 ENCOUNTER — HISTORICAL (OUTPATIENT)
Dept: PHYSICAL THERAPY | Facility: HOSPITAL | Age: 56
End: 2021-11-15

## 2021-11-16 ENCOUNTER — HISTORICAL (OUTPATIENT)
Dept: PHYSICAL THERAPY | Facility: HOSPITAL | Age: 56
End: 2021-11-16

## 2021-11-17 ENCOUNTER — HISTORICAL (OUTPATIENT)
Dept: PHYSICAL THERAPY | Facility: HOSPITAL | Age: 56
End: 2021-11-17

## 2022-02-15 ENCOUNTER — HISTORICAL (OUTPATIENT)
Dept: LAB | Facility: HOSPITAL | Age: 57
End: 2022-02-15

## 2022-02-15 LAB
ALBUMIN SERPL-MCNC: 3.8 G/DL (ref 3.5–5)
ALBUMIN/GLOB SERPL: 1.3 {RATIO} (ref 1.1–2)
ALP SERPL-CCNC: 56 U/L (ref 40–150)
ALT SERPL-CCNC: 32 U/L (ref 0–55)
APPEARANCE, UA: CLEAR
AST SERPL-CCNC: 17 U/L (ref 5–34)
BACTERIA SPEC CULT: NORMAL
BILIRUB SERPL-MCNC: 0.8 MG/DL
BILIRUB UR QL STRIP: NEGATIVE
BILIRUBIN DIRECT+TOT PNL SERPL-MCNC: 0.2 (ref 0–0.5)
BILIRUBIN DIRECT+TOT PNL SERPL-MCNC: 0.6 (ref 0–0.8)
BUN SERPL-MCNC: 11.3 MG/DL (ref 8.4–25.7)
CALCIUM SERPL-MCNC: 9.9 MG/DL (ref 8.7–10.5)
CHLORIDE SERPL-SCNC: 106 MMOL/L (ref 98–107)
CHOLEST SERPL-MCNC: 244 MG/DL
CHOLEST/HDLC SERPL: 6 {RATIO} (ref 0–5)
CO2 SERPL-SCNC: 30 MMOL/L (ref 22–29)
COLOR UR: YELLOW
CREAT SERPL-MCNC: 1 MG/DL (ref 0.73–1.18)
ERYTHROCYTE [DISTWIDTH] IN BLOOD BY AUTOMATED COUNT: 12.8 % (ref 11.5–17)
GLOBULIN SER-MCNC: 3 G/DL (ref 2.4–3.5)
GLUCOSE (UA): NEGATIVE
GLUCOSE SERPL-MCNC: 104 MG/DL (ref 74–100)
HCT VFR BLD AUTO: 43.5 % (ref 42–52)
HDLC SERPL-MCNC: 42 MG/DL (ref 35–60)
HEMOLYSIS INTERF INDEX SERPL-ACNC: 1
HGB BLD-MCNC: 13.9 G/DL (ref 14–18)
HGB UR QL STRIP: NEGATIVE
ICTERIC INTERF INDEX SERPL-ACNC: 1
KETONES UR QL STRIP: NEGATIVE
LDLC SERPL CALC-MCNC: 153 MG/DL (ref 50–140)
LEUKOCYTE ESTERASE UR QL STRIP: NEGATIVE
LIPEMIC INTERF INDEX SERPL-ACNC: 11
MCH RBC QN AUTO: 28.7 PG (ref 27–31)
MCHC RBC AUTO-ENTMCNC: 32 G/DL (ref 33–36)
MCV RBC AUTO: 89.9 FL (ref 80–94)
NITRITE UR QL STRIP: NEGATIVE
PH UR STRIP: 5.5 [PH] (ref 5–9)
PLATELET # BLD AUTO: 205 10*3/UL (ref 130–400)
PMV BLD AUTO: 11.3 FL (ref 9.4–12.4)
POTASSIUM SERPL-SCNC: 4.2 MMOL/L (ref 3.5–5.1)
PROT SERPL-MCNC: 6.8 G/DL (ref 6.4–8.3)
PROT UR QL STRIP: NEGATIVE
PSA SERPL-MCNC: 1.93 NG/ML
RBC # BLD AUTO: 4.84 10*6/UL (ref 4.7–6.1)
RBC #/AREA URNS HPF: NORMAL /[HPF] (ref 0–2)
SODIUM SERPL-SCNC: 142 MMOL/L (ref 136–145)
SP GR UR STRIP: 1.01 (ref 1–1.03)
SQUAMOUS EPITHELIAL, UA: NORMAL
TRIGL SERPL-MCNC: 243 MG/DL (ref 34–140)
TSH SERPL-ACNC: 0.89 M[IU]/L (ref 0.35–4.94)
UROBILINOGEN UR STRIP-ACNC: 0.2
VLDLC SERPL CALC-MCNC: 49 MG/DL
WBC # SPEC AUTO: 7.7 10*3/UL (ref 4.5–11.5)
WBC #/AREA URNS HPF: NORMAL /[HPF] (ref 0–2)

## 2022-02-21 ENCOUNTER — HISTORICAL (OUTPATIENT)
Dept: RESPIRATORY THERAPY | Facility: HOSPITAL | Age: 57
End: 2022-02-21

## 2022-04-10 ENCOUNTER — HISTORICAL (OUTPATIENT)
Dept: ADMINISTRATIVE | Facility: HOSPITAL | Age: 57
End: 2022-04-10
Payer: MEDICAID

## 2022-04-26 ENCOUNTER — HISTORICAL (OUTPATIENT)
Dept: ADMINISTRATIVE | Facility: HOSPITAL | Age: 57
End: 2022-04-26
Payer: MEDICAID

## 2022-04-26 ENCOUNTER — HISTORICAL (OUTPATIENT)
Dept: RADIOLOGY | Facility: HOSPITAL | Age: 57
End: 2022-04-26
Payer: MEDICAID

## 2022-04-27 VITALS
BODY MASS INDEX: 36.99 KG/M2 | SYSTOLIC BLOOD PRESSURE: 136 MMHG | WEIGHT: 258.38 LBS | OXYGEN SATURATION: 97 % | DIASTOLIC BLOOD PRESSURE: 91 MMHG | HEIGHT: 70 IN

## 2022-05-03 NOTE — HISTORICAL [facility]
This is a historical note converted from [facility]. Formatting and pictures may have been removed.  Please reference Mary Grace for original formatting and attached multimedia. History of Present Illness  Mr. Cannon returns today for continued management of a dog bite yesterday however in the antecubital area.? He offers no new complaints today.  Physical Exam  Vitals & Measurements  T:?36.7? ?C (Oral)? HR:?68(Peripheral)? RR:?18? BP:?119/79? SpO2:?96%?  ?  The patient has an open wound over the left antecubital area with slight extension laterally.? Excellent granulation tissue is present.? Small amount of eschar is present laterally.? No evidence of infection.? The patient has excellent range of motion?his left forearm and hand.? No evidence of?sensory deficit.  Assessment/Plan  1.?Animal bite wound of upper limb  ? Mesalt packing of the wound will be continued daily.? Appointment has been advised in 1 week.  Ordered:  Wound Care Team Treatment, 07/03/18 8:29:00 CDT, Stop date 07/03/18 8:29:00 CDT  ?  Animal bite  ?  Orders:  Wound Care Outpatient, *Est. 07/10/18 3:00:00 CDT, *Est. Stop date 07/10/18 3:00:00 CDT, [facility], FU with Physician in 1 week  Wound Care Outpatient, 07/03/18 8:28:00 CDT, Stop date 07/03/18 8:28:00 CDT, [facility], follow up in 1 week with MD  Wound Care Team Treatment, 07/03/18 8:29:00 CDT, Stop date 07/03/18 8:29:00 CDT, cleanse with soap and water, apply Mesalt, cover with 4x4 gauze, wrap with horacio, ace wrap. change daily   Problem List/Past Medical History  Ongoing  Animal bite wound of upper limb  CAD (coronary artery disease)  FRANCOIS (dyspnea on exertion)  Fracture of nasal bones  Hemorrhoid  Obesity  Recurrent productive cough  Tobacco user  Trouble swallowing  Ulcer, gastric, acute  Historical  GERD (gastroesophageal reflux disease)  H/O: gastric ulcer  Procedure/Surgical History  Catheter placement in coronary artery(s) for coronary angiography, including  intraprocedural injection(s) for coronary angiography, imaging supervision and interpretation; with left heart catheterization including intraprocedural injection(s) for left anton (11/02/2016), Dilation of Coronary Artery, One Artery with Four or More Drug-eluting Intraluminal Devices, Percutaneous Approach (11/02/2016), Fluoroscopy of Left Heart using Low Osmolar Contrast (11/02/2016), Fluoroscopy of Multiple Coronary Arteries using Low Osmolar Contrast (11/02/2016), Measurement of Cardiac Sampling and Pressure, Left Heart, Percutaneous Approach (11/02/2016), Percutaneous transluminal revascularization of chronic total occlusion, coronary artery, coronary artery branch, or coronary artery bypass graft, any combination of intracoronary stent, atherectomy and angioplasty; single vessel (11/02/2016), Biopsy Gastrointestional (01/23/2015), Esophagogastroduodenoscopy (01/23/2015), Esophagogastroduodenoscopy [EGD] with closed biopsy (01/23/2015), Esophagogastroduodenoscopy, flexible, transoral; with biopsy, single or multiple (01/23/2015), Excision Lesion Facial (Right) (10/24/2014), Excision, other benign lesion including margins, except skin tag (unless listed elsewhere), face, ears, eyelids, nose, lips, mucous membrane; excised diameter 0.5 cm or less. (10/24/2014), Local excision or destruction of other lesion of nose (10/24/2014), Nasal Valve Repair (None) (10/24/2014), Open reduction of nasal fracture (10/24/2014), Open treatment of nasal septal fracture, with or without stabilization. (10/24/2014), Other septoplasty (10/24/2014), Septoplasty (None) (10/24/2014), Colonoscopy (06/01/2014), Esophagogastroduodenoscopy (06/01/2014), reconstruction of digit (01/01/2006), Appendectomy (01/01/1997), Tonsillectomy (1981), appendectomy, heart stents, Tonsillectomy.  Medications  ALBUTEROL 0.083%(2.5MG/3ML) 30X3ML, NEB, q4hr  ANORO ELLIPTA 62.5-25MCG ORAL INH30, 1 puff(s), Oral, Daily  aspirin 81 mg oral Delayed Release (EC)  tablet, 81 mg= 1 tab(s), Oral, Daily, 3 refills  Augmentin 875 mg-125 mg oral tablet, 1 tab(s), Oral, q12hr,? ?Not Taking, Completed Rx  AZITHROMYCIN 250MG TABLETS 6-SALUD,? ?Not Taking, Completed Rx  CARVEDILOL 3.125MG TABLETS, 3.125 mg= 1 tab(s), Oral, BID,? ?Not taking  cetirizine 10 mg oral tablet, 10 mg= 1 tab(s), Oral, Daily  CITALOPRAM 20MG TABLETS, 20 mg= 1 tab(s), Oral, Daily  CLOTRIMAZOLE-BETAMETHASONE CRM 15GM, 1 carter, TOP, BID  DICLOFENAC SODIUM 50MG DR TABLETS, 50 mg= 1 tab(s), Oral, TID  Flonase 50 mcg/inh nasal spray, 1 spray(s), Nasal, Once  FLOVENT HFA 44MCG ORAL INH 120INH, INH, BID  HYDROCHLOROTHIAZIDE 25MG TABLETS, 25 mg= 1 tab(s), Oral, Daily  HYDROXYZINE PAMOATE 50MG CAPSULES, 50 mg= 1 cap(s), Oral, qPM  LOSARTAN/HCTZ 50/12.5MG TABLETS, 1 tab(s), Oral, Daily,? ?Not taking  MELOXICAM 7.5MG TABLETS, 7.5 mg= 1 tab(s), Oral, BID,? ?Not taking  METHYLPREDNISOLONE 4MG DOSPAK 21S,? ?Not Taking, Completed Rx  metoprolol tartrate 25 mg oral tab, 25 mg= 1 tab(s), Oral, BID, 6 refills,? ?Not taking  misc, See Instructions  JAYDEN/POLY/DEX 0.1% OPTH OINT 3.5GM, Eye-Left, TID,? ?Not Taking, Completed Rx  OMEPRAZOLE 40MG CAPSULES, 40 mg= 1 cap(s), Oral, BID,? ?Not Taking per Prescriber  RAMIPRIL 10MG CAPSULES, 10 mg= 1 cap(s), Oral, Daily  regadenoson, 0.4 mg= 5 mL, IV, Once-Unscheduled  ROSUVASTATIN CALCIUM 40MGTABLETS, 40 mg= 1 tab(s), Oral, Daily  TIZANIDINE 4MG TABLETS, 4 mg= 1 tab(s), Oral, BID  TRAMADOL 50MG TABLETS,? ?Not taking  TRAZODONE 50MG TABLETS, 50 mg= 1 tab(s), Oral, qPM,? ?Not taking  VENTOLIN HFA INH W/DOS CTR 200PUFFS, Oral, QID  Allergies  Advil  codeine?(Flushing, C/O: itching)  Social History  Alcohol - Medium Risk, 01/23/2015  Current, Beer, 1-2 times per month, 01/23/2015  Employment/School  Employed, 02/28/2015  Nutrition/Health - No Risk, 02/28/2015  Substance Abuse  Current, Marijuana, 1-2 times per year, 02/28/2015  Tobacco - Medium Risk, 01/23/2015  Former smoker, 04/17/2018  Current  every day smoker, 20 per day., 11/02/2016  Family History  Diabetes mellitus.: Sister.  Heart failure.: Father.  Hypertension.: Mother.  Stroke: Father.  Immunizations  Vaccine Date Status   tetanus/diphtheria/pertussis, acel(Tdap) 06/26/2018 Given   Health Maintenance  Health Maintenance  ???Pending?(in the next year)  ??? ??OverDue  ??? ? ? ?Aspirin Therapy for CVD Prevention due??11/03/17??and every 1??year(s)  ??? ??Due?  ??? ? ? ?Alcohol Misuse Screening due??07/03/18??and every 1??year(s)  ??? ??Due In Future?  ??? ? ? ?Influenza Vaccine not due until??10/02/18??and every 1??year(s)  ??? ? ? ?Depression Screening not due until??04/17/19??and every 1??year(s)  ??? ? ? ?Body Mass Index Check not due until??05/04/19??and every 1??year(s)  ??? ? ? ?Obesity Screening not due until??05/04/19??and every 1??year(s)  ??? ? ? ?Lipid Screening not due until??05/07/19??and every 1??year(s)  ??? ? ? ?Smoking Cessation not due until??06/27/19??and every 1??year(s)  ??? ? ? ?Tobacco Use Screening not due until??06/27/19??and every 1??year(s)  ???Satisfied?(in the past 1 year)  ??? ??Satisfied?  ??? ? ? ?Blood Pressure Screening on??07/03/18.??Satisfied by Mariluz Linton RN  ??? ? ? ?Body Mass Index Check on??05/04/18.??Satisfied by Jose Yu RN  ??? ? ? ?COPD Maintenance-Spirometry on??09/20/17.??Satisfied by Ivett Mcmillan  ??? ? ? ?Colorectal Screening on??06/24/18.??Satisfied by Lang Goodrich  ??? ? ? ?Coronary Artery Disease Maintenance-Lipid Lowering Therapy on??08/15/17.  ??? ? ? ?Depression Screening on??04/17/18.??Satisfied by Xuan Mims LPN.  ??? ? ? ?Diabetes Screening on??05/31/18.??Satisfied by Lang Goodrich  ??? ? ? ?Hypertension Management-Blood Pressure on??07/03/18.??Satisfied by Royer SAAVEDRA, Mariluz Roblero  ??? ? ? ?Lipid Screening on??05/07/18.??Satisfied by Brenda Marx  ??? ? ? ?Obesity Screening on??05/04/18.??Satisfied by Aimee SAAVEDRA, Jose  ??? ? ? ?Smoking Cessation  on??06/27/18.??Satisfied by Tramaine Beatty LPN  ??? ? ? ?Tetanus Vaccine on??06/26/18.??Satisfied by Karrie Austin RN  ??? ? ? ?Tobacco Use Screening on??06/27/18.??Satisfied by Tramaine Beatty LPN  ?  ?

## 2022-05-03 NOTE — HISTORICAL [facility]
This is a historical note converted from Mary Grace. Formatting and pictures may have been removed.  Please reference Mary Grace for original formatting and attached multimedia. History of Present Illness  Mr. Butcher is today for continued management of an open wound to his left antecubital area resulting from a dog bite.? He states that he debrided eschar material off of the wound himself.? There has been some bleeding at the site but this has been easily controlled with pressure.  Physical Exam  Vitals & Measurements  T:?36.8? ?C (Oral)? HR:?71(Peripheral)? RR:?18? BP:?125/84?  ?  The patient has?a large antecubital wound?which is granulating well?and demonstrates no evidence of?recurrent eschar formation?or infection.  Assessment/Plan  1.?Animal bite wound of upper limb  ? Therapy will be?initiated with Promogran?with an appropriate secondary dressing.? The dressing will be changed?daily to?every other day?as indicated.? A follow-up appointment has been advised in 1 week.  Ordered:  Wound Care Team Treatment, 07/10/18 8:48:00 CDT, Stop date 07/10/18 8:48:00 CDT  ?  Orders:  Wound Care Outpatient, *Est. 07/17/18 3:00:00 CDT, *Est. Stop date 07/17/18 3:00:00 CDT, [facility], FU with Physician in 1 week   Problem List/Past Medical History  Ongoing  Animal bite wound of upper limb  CAD (coronary artery disease)  FRANCOIS (dyspnea on exertion)  Fracture of nasal bones  Hemorrhoid  Obesity  Recurrent productive cough  Tobacco user  Trouble swallowing  Ulcer, gastric, acute  Historical  GERD (gastroesophageal reflux disease)  H/O: gastric ulcer  Procedure/Surgical History  Catheter placement in coronary artery(s) for coronary angiography, including intraprocedural injection(s) for coronary angiography, imaging supervision and interpretation; with left heart catheterization including intraprocedural injection(s) for left anton (11/02/2016), Dilation of Coronary Artery, One Artery with Four or More Drug-eluting Intraluminal  Devices, Percutaneous Approach (11/02/2016), Fluoroscopy of Left Heart using Low Osmolar Contrast (11/02/2016), Fluoroscopy of Multiple Coronary Arteries using Low Osmolar Contrast (11/02/2016), Measurement of Cardiac Sampling and Pressure, Left Heart, Percutaneous Approach (11/02/2016), Percutaneous transluminal revascularization of chronic total occlusion, coronary artery, coronary artery branch, or coronary artery bypass graft, any combination of intracoronary stent, atherectomy and angioplasty; single vessel (11/02/2016), Biopsy Gastrointestional (01/23/2015), Esophagogastroduodenoscopy (01/23/2015), Esophagogastroduodenoscopy [EGD] with closed biopsy (01/23/2015), Esophagogastroduodenoscopy, flexible, transoral; with biopsy, single or multiple (01/23/2015), Excision Lesion Facial (Right) (10/24/2014), Excision, other benign lesion including margins, except skin tag (unless listed elsewhere), face, ears, eyelids, nose, lips, mucous membrane; excised diameter 0.5 cm or less. (10/24/2014), Local excision or destruction of other lesion of nose (10/24/2014), Nasal Valve Repair (None) (10/24/2014), Open reduction of nasal fracture (10/24/2014), Open treatment of nasal septal fracture, with or without stabilization. (10/24/2014), Other septoplasty (10/24/2014), Septoplasty (None) (10/24/2014), Colonoscopy (06/01/2014), Esophagogastroduodenoscopy (06/01/2014), reconstruction of digit (01/01/2006), Appendectomy (01/01/1997), Tonsillectomy (1981), appendectomy, heart stents, Tonsillectomy.  Medications  ALBUTEROL 0.083%(2.5MG/3ML) 30X3ML, NEB, q4hr  ANORO ELLIPTA 62.5-25MCG ORAL INH30, 1 puff(s), Oral, Daily  aspirin 81 mg oral Delayed Release (EC) tablet, 81 mg= 1 tab(s), Oral, Daily, 3 refills  AZITHROMYCIN 250MG TABLETS 6-SALUD,? ?Not Taking, Completed Rx  CARVEDILOL 3.125MG TABLETS, 3.125 mg= 1 tab(s), Oral, BID,? ?Not taking  cetirizine 10 mg oral tablet, 10 mg= 1 tab(s), Oral, Daily  CITALOPRAM 20MG TABLETS, 20 mg= 1  tab(s), Oral, Daily  CLOTRIMAZOLE-BETAMETHASONE CRM 15GM, 1 carter, TOP, BID  DICLOFENAC SODIUM 50MG DR TABLETS, 50 mg= 1 tab(s), Oral, TID  Flonase 50 mcg/inh nasal spray, 1 spray(s), Nasal, Once  FLOVENT HFA 44MCG ORAL INH 120INH, INH, BID  HYDROCHLOROTHIAZIDE 25MG TABLETS, 25 mg= 1 tab(s), Oral, Daily  HYDROCODONE/ACETAMINOPHEN  T  HYDROXYZINE PAMOATE 50MG CAPSULES, 50 mg= 1 cap(s), Oral, qPM  LOSARTAN/HCTZ 50/12.5MG TABLETS, 1 tab(s), Oral, Daily,? ?Not taking  MELOXICAM 7.5MG TABLETS, 7.5 mg= 1 tab(s), Oral, BID,? ?Not taking  METHYLPREDNISOLONE 4MG DOSPAK 21S,? ?Not Taking, Completed Rx  metoprolol tartrate 25 mg oral tab, 25 mg= 1 tab(s), Oral, BID, 6 refills,? ?Not taking  misc, See Instructions  JAYDEN/POLY/DEX 0.1% OPTH OINT 3.5GM, Eye-Left, TID,? ?Not Taking, Completed Rx  OMEPRAZOLE 40MG CAPSULES, 40 mg= 1 cap(s), Oral, BID,? ?Not Taking per Prescriber  RAMIPRIL 10MG CAPSULES, 10 mg= 1 cap(s), Oral, Daily  regadenoson, 0.4 mg= 5 mL, IV, Once-Unscheduled  ROSUVASTATIN CALCIUM 40MGTABLETS, 40 mg= 1 tab(s), Oral, Daily  TIZANIDINE 4MG TABLETS, 4 mg= 1 tab(s), Oral, BID  TRAMADOL 50MG TABLETS,? ?Not taking  TRAZODONE 50MG TABLETS, 50 mg= 1 tab(s), Oral, qPM,? ?Not taking  VENTOLIN HFA INH W/DOS CTR 200PUFFS, Oral, QID  Allergies  Advil  codeine?(Flushing, C/O: itching)  Social History  Alcohol - Medium Risk, 01/23/2015  Current, Beer, 1-2 times per month, 01/23/2015  Employment/School  Employed, 02/28/2015  Nutrition/Health - No Risk, 02/28/2015  Substance Abuse  Current, Marijuana, 1-2 times per year, 02/28/2015  Tobacco - Medium Risk, 01/23/2015  Former smoker, 04/17/2018  Current every day smoker, 20 per day., 11/02/2016  Family History  Diabetes mellitus.: Sister.  Heart failure.: Father.  Hypertension.: Mother.  Stroke: Father.  Immunizations  Vaccine Date Status   tetanus/diphtheria/pertussis, acel(Tdap) 06/26/2018 Given   Health Maintenance  Health Maintenance  ???Pending?(in the next year)  ???  ??OverDue  ??? ? ? ?Aspirin Therapy for CVD Prevention due??11/03/17??and every 1??year(s)  ??? ??Due?  ??? ? ? ?Alcohol Misuse Screening due??07/10/18??and every 1??year(s)  ??? ??Due In Future?  ??? ? ? ?Influenza Vaccine not due until??10/02/18??and every 1??year(s)  ??? ? ? ?Depression Screening not due until??04/17/19??and every 1??year(s)  ??? ? ? ?Body Mass Index Check not due until??05/04/19??and every 1??year(s)  ??? ? ? ?Obesity Screening not due until??05/04/19??and every 1??year(s)  ??? ? ? ?Lipid Screening not due until??05/07/19??and every 1??year(s)  ??? ? ? ?Smoking Cessation not due until??06/27/19??and every 1??year(s)  ??? ? ? ?Tobacco Use Screening not due until??06/27/19??and every 1??year(s)  ???Satisfied?(in the past 1 year)  ??? ??Satisfied?  ??? ? ? ?Blood Pressure Screening on??07/10/18.??Satisfied by Tramaine Beatty LPN  ??? ? ? ?Body Mass Index Check on??05/04/18.??Satisfied by Jose Yu RN  ??? ? ? ?COPD Maintenance-Spirometry on??09/20/17.??Satisfied by Ivett Mcmillan  ??? ? ? ?Colorectal Screening on??06/24/18.??Satisfied by Lang Goodrich  ??? ? ? ?Coronary Artery Disease Maintenance-Lipid Lowering Therapy on??08/15/17.  ??? ? ? ?Depression Screening on??04/17/18.??Satisfied by Xuan Mims LPN  ??? ? ? ?Diabetes Screening on??05/31/18.??Satisfied by Bearb, Lang J.  ??? ? ? ?Hypertension Management-Blood Pressure on??07/10/18.??Satisfied by Tramaine Beatty LPN  ??? ? ? ?Lipid Screening on??05/07/18.??Satisfied by Brenda Marx  ??? ? ? ?Obesity Screening on??05/04/18.??Satisfied by Jose Yu RN  ??? ? ? ?Smoking Cessation on??06/27/18.??Satisfied by Tramaine Beatty LPN  ??? ? ? ?Tetanus Vaccine on??06/26/18.??Satisfied by Karrie Austin RN  ??? ? ? ?Tobacco Use Screening on??06/27/18.??Satisfied by Tramaine Beatty LPN  ?  ?

## 2022-05-20 DIAGNOSIS — J44.9 COPD (CHRONIC OBSTRUCTIVE PULMONARY DISEASE): Primary | ICD-10-CM

## 2022-05-23 DIAGNOSIS — J44.89 COPD (CHRONIC OBSTRUCTIVE PULMONARY DISEASE) WITH CHRONIC BRONCHITIS: Primary | ICD-10-CM

## 2022-05-24 ENCOUNTER — PROCEDURE VISIT (OUTPATIENT)
Dept: RESPIRATORY THERAPY | Facility: HOSPITAL | Age: 57
End: 2022-05-24
Attending: INTERNAL MEDICINE
Payer: MEDICAID

## 2022-05-24 ENCOUNTER — LAB VISIT (OUTPATIENT)
Dept: LAB | Facility: HOSPITAL | Age: 57
End: 2022-05-24
Attending: INTERNAL MEDICINE
Payer: MEDICAID

## 2022-05-24 DIAGNOSIS — R06.09 PLATYPNEA-ORTHODEOXIA SYNDROME: ICD-10-CM

## 2022-05-24 DIAGNOSIS — J44.89 OBSTRUCTIVE CHRONIC BRONCHITIS WITHOUT EXACERBATION: Primary | ICD-10-CM

## 2022-05-24 DIAGNOSIS — J44.9 COPD (CHRONIC OBSTRUCTIVE PULMONARY DISEASE): ICD-10-CM

## 2022-05-24 LAB
ALBUMIN SERPL-MCNC: 3.7 GM/DL (ref 3.5–5)
ALBUMIN/GLOB SERPL: 1 RATIO (ref 1.1–2)
ALP SERPL-CCNC: 60 UNIT/L (ref 40–150)
ALT SERPL-CCNC: 32 UNIT/L (ref 0–55)
AST SERPL-CCNC: 15 UNIT/L (ref 5–34)
BASOPHILS # BLD AUTO: 0.06 X10(3)/MCL (ref 0–0.2)
BASOPHILS NFR BLD AUTO: 0.6 %
BILIRUBIN DIRECT+TOT PNL SERPL-MCNC: 0.9 MG/DL
BUN SERPL-MCNC: 12.9 MG/DL (ref 8.4–25.7)
CALCIUM SERPL-MCNC: 9.5 MG/DL (ref 8.4–10.2)
CHLORIDE SERPL-SCNC: 105 MMOL/L (ref 98–107)
CO2 SERPL-SCNC: 27 MMOL/L (ref 22–29)
CREAT SERPL-MCNC: 0.97 MG/DL (ref 0.73–1.18)
EOSINOPHIL # BLD AUTO: 0.18 X10(3)/MCL (ref 0–0.9)
EOSINOPHIL NFR BLD AUTO: 1.7 %
ERYTHROCYTE [DISTWIDTH] IN BLOOD BY AUTOMATED COUNT: 12.6 % (ref 11.5–17)
GLOBULIN SER-MCNC: 3.8 GM/DL (ref 2.4–3.5)
GLUCOSE SERPL-MCNC: 103 MG/DL (ref 74–100)
HCT VFR BLD AUTO: 45.3 % (ref 42–52)
HGB BLD-MCNC: 14.4 GM/DL (ref 14–18)
IMM GRANULOCYTES # BLD AUTO: 0.06 X10(3)/MCL (ref 0–0.02)
IMM GRANULOCYTES NFR BLD AUTO: 0.6 % (ref 0–0.43)
LYMPHOCYTES # BLD AUTO: 1.72 X10(3)/MCL (ref 0.6–4.6)
LYMPHOCYTES NFR BLD AUTO: 16.1 %
MAGNESIUM SERPL-MCNC: 2.5 MG/DL (ref 1.6–2.6)
MCH RBC QN AUTO: 28.7 PG (ref 27–31)
MCHC RBC AUTO-ENTMCNC: 31.8 MG/DL (ref 33–36)
MCV RBC AUTO: 90.4 FL (ref 80–94)
MONOCYTES # BLD AUTO: 0.97 X10(3)/MCL (ref 0.1–1.3)
MONOCYTES NFR BLD AUTO: 9.1 %
NEUTROPHILS # BLD AUTO: 7.7 X10(3)/MCL (ref 2.1–9.2)
NEUTROPHILS NFR BLD AUTO: 71.9 %
PHOSPHATE SERPL-MCNC: 1.9 MG/DL (ref 2.3–4.7)
PLATELET # BLD AUTO: 191 X10(3)/MCL (ref 130–400)
PMV BLD AUTO: 11.3 FL (ref 9.4–12.4)
POTASSIUM SERPL-SCNC: 4.2 MMOL/L (ref 3.5–5.1)
PROT SERPL-MCNC: 7.5 GM/DL (ref 6.4–8.3)
RBC # BLD AUTO: 5.01 X10(6)/MCL (ref 4.7–6.1)
SODIUM SERPL-SCNC: 142 MMOL/L (ref 136–145)
T3FREE SERPL-MCNC: 2.92 PG/ML (ref 1.57–3.91)
T4 FREE SERPL-MCNC: 0.93 NG/DL (ref 0.7–1.48)
TSH SERPL-ACNC: 0.92 UIU/ML (ref 0.35–4.94)
WBC # SPEC AUTO: 10.7 X10(3)/MCL (ref 4.5–11.5)

## 2022-05-24 PROCEDURE — 83735 ASSAY OF MAGNESIUM: CPT

## 2022-05-24 PROCEDURE — 84100 ASSAY OF PHOSPHORUS: CPT

## 2022-05-24 PROCEDURE — 82103 ALPHA-1-ANTITRYPSIN TOTAL: CPT

## 2022-05-24 PROCEDURE — 84481 FREE ASSAY (FT-3): CPT

## 2022-05-24 PROCEDURE — 80053 COMPREHEN METABOLIC PANEL: CPT

## 2022-05-24 PROCEDURE — 87070 CULTURE OTHR SPECIMN AEROBIC: CPT

## 2022-05-24 PROCEDURE — 86003 ALLG SPEC IGE CRUDE XTRC EA: CPT

## 2022-05-24 PROCEDURE — 87305 ASPERGILLUS AG IA: CPT

## 2022-05-24 PROCEDURE — 84439 ASSAY OF FREE THYROXINE: CPT

## 2022-05-24 PROCEDURE — 94727 GAS DIL/WSHOT DETER LNG VOL: CPT

## 2022-05-24 PROCEDURE — 84443 ASSAY THYROID STIM HORMONE: CPT

## 2022-05-24 PROCEDURE — 85025 COMPLETE CBC W/AUTO DIFF WBC: CPT

## 2022-05-24 PROCEDURE — 86480 TB TEST CELL IMMUN MEASURE: CPT | Performed by: INTERNAL MEDICINE

## 2022-05-24 PROCEDURE — 36415 COLL VENOUS BLD VENIPUNCTURE: CPT

## 2022-05-24 PROCEDURE — 82785 ASSAY OF IGE: CPT

## 2022-05-24 PROCEDURE — 94729 DIFFUSING CAPACITY: CPT

## 2022-05-25 LAB
A1AT SERPL NEPH-MCNC: 160 MG/DL (ref 100–190)
IGE SERPL-ACNC: 177 KU/L

## 2022-05-26 LAB
BACTERIA SPEC CULT: ABNORMAL
GALACTOMANNAN AG SERPL IA-ACNC: NORMAL
GAMMA INTERFERON BACKGROUND BLD IA-ACNC: 0.01 IU/ML
GRAM STN SPEC: ABNORMAL
GRAM STN SPEC: ABNORMAL
M TB IFN-G BLD-IMP: NEGATIVE
M TB IFN-G CD4+ BCKGRND COR BLD-ACNC: 0 IU/ML
M TB IFN-G CD4+CD8+ BCKGRND COR BLD-ACNC: 0 IU/ML
MITOGEN IGNF BCKGRD COR BLD-ACNC: 2.48 IU/ML

## 2022-06-08 LAB
ALLERGEN ALTERNARIA ALTERNATA IGE (OLG): <0.1
ALLERGEN ASPERGILLUS FUMIGATUS IGE (OLG): <0.1
ALLERGEN BERMUDA GRASS IGE (OLG): <0.1
ALLERGEN BOXELDER MAPLE TREE IGE (OLG): <0.1
ALLERGEN CAT DANDER IGE (OLG): <0.1
ALLERGEN CLADOSPORIUM HERBARUM IGE (OLG): <0.1
ALLERGEN COCKROACH GERMAN IGE (OLG): <0.1
ALLERGEN COMMON RAGWEED IGE (OLG): <0.1
ALLERGEN DOG DANDER IGE (OLG): <0.1
ALLERGEN DUST MITE (D. PTERONYSSINUS) IGE (OLG): <0.1
ALLERGEN DUST MITE (D.FARINAE) IGE (OLG): <0.1
ALLERGEN ELM TREE IGE (OLG): <0.1
ALLERGEN HORSE DANDER IGE (OLG): <0.1
ALLERGEN MOUNTAIN JUNIPER TREE IGE (OLG): <0.1
ALLERGEN MOUSE URINE PROTEINS IGE (OLG): <0.1
ALLERGEN MULBERRY TREE IGE (OLG): <0.1
ALLERGEN OAK TREE IGE (OLG): <0.1
ALLERGEN PECAN HICKORY TREE IGE (OLG): <0.1
ALLERGEN PENICILLIUM CHRYSOGENUM IGE (OLG): <0.1
ALLERGEN PIGWEED IGE (OLG): <0.1
ALLERGEN ROUGH MARSH ELDER IGE (OLG): <0.1
ALLERGEN SILVER BIRCH TREE IGE (OLG): <0.1
ALLERGEN TIMOTHY GRASS IGE (OLG): <0.1
ALLERGEN WALNUT TREE IGE (OLG): <0.1

## 2022-06-16 DIAGNOSIS — M62.831 CHARLEYHORSE: ICD-10-CM

## 2022-06-16 DIAGNOSIS — G54.9 NERVE ROOT AND PLEXUS DISORDER, UNSPECIFIED: Primary | ICD-10-CM

## 2022-06-17 ENCOUNTER — HOSPITAL ENCOUNTER (OUTPATIENT)
Dept: RADIOLOGY | Facility: HOSPITAL | Age: 57
Discharge: HOME | End: 2022-06-17
Attending: FAMILY MEDICINE
Payer: MEDICAID

## 2022-06-17 DIAGNOSIS — G54.9 NERVE ROOT AND PLEXUS DISORDER, UNSPECIFIED: ICD-10-CM

## 2022-06-17 DIAGNOSIS — M62.831 CHARLEYHORSE: ICD-10-CM

## 2022-06-17 PROCEDURE — 93925 LOWER EXTREMITY STUDY: CPT | Mod: TC

## 2022-08-16 ENCOUNTER — HOSPITAL ENCOUNTER (EMERGENCY)
Facility: HOSPITAL | Age: 57
Discharge: HOME | End: 2022-08-16
Payer: MEDICAID

## 2022-08-16 VITALS
DIASTOLIC BLOOD PRESSURE: 97 MMHG | OXYGEN SATURATION: 98 % | RESPIRATION RATE: 18 BRPM | WEIGHT: 250 LBS | TEMPERATURE: 98 F | HEIGHT: 70 IN | BODY MASS INDEX: 35.79 KG/M2 | HEART RATE: 82 BPM | SYSTOLIC BLOOD PRESSURE: 157 MMHG

## 2022-08-16 DIAGNOSIS — A09 INFECTIOUS DIARRHEA IN ADULT PATIENT: Primary | ICD-10-CM

## 2022-08-16 LAB
ALBUMIN SERPL-MCNC: 4.3 GM/DL (ref 3.5–5)
ALBUMIN/GLOB SERPL: 1.2 RATIO (ref 1.1–2)
ALP SERPL-CCNC: 54 UNIT/L (ref 40–150)
ALT SERPL-CCNC: 27 UNIT/L (ref 0–55)
AST SERPL-CCNC: 14 UNIT/L (ref 5–34)
BASOPHILS # BLD AUTO: 0.07 X10(3)/MCL (ref 0–0.2)
BASOPHILS NFR BLD AUTO: 0.7 %
BILIRUBIN DIRECT+TOT PNL SERPL-MCNC: 2.6 MG/DL
BUN SERPL-MCNC: 19.8 MG/DL (ref 8.4–25.7)
CALCIUM SERPL-MCNC: 9.9 MG/DL (ref 8.4–10.2)
CHLORIDE SERPL-SCNC: 105 MMOL/L (ref 98–107)
CO2 SERPL-SCNC: 28 MMOL/L (ref 22–29)
CREAT SERPL-MCNC: 1.24 MG/DL (ref 0.73–1.18)
EOSINOPHIL # BLD AUTO: 0.25 X10(3)/MCL (ref 0–0.9)
EOSINOPHIL NFR BLD AUTO: 2.4 %
ERYTHROCYTE [DISTWIDTH] IN BLOOD BY AUTOMATED COUNT: 12.5 % (ref 11.5–17)
GFR SERPLBLD CREATININE-BSD FMLA CKD-EPI: >60 MLS/MIN/1.73/M2
GLOBULIN SER-MCNC: 3.5 GM/DL (ref 2.4–3.5)
GLUCOSE SERPL-MCNC: 109 MG/DL (ref 74–100)
HAV IGM SERPL QL IA: NONREACTIVE
HBV CORE IGM SERPL QL IA: NONREACTIVE
HBV SURFACE AG SERPL QL IA: NONREACTIVE
HCT VFR BLD AUTO: 48.1 % (ref 42–52)
HCV AB SERPL QL IA: NONREACTIVE
HGB BLD-MCNC: 16.1 GM/DL (ref 14–18)
IMM GRANULOCYTES # BLD AUTO: 0.05 X10(3)/MCL (ref 0–0.04)
IMM GRANULOCYTES NFR BLD AUTO: 0.5 %
LYMPHOCYTES # BLD AUTO: 2.06 X10(3)/MCL (ref 0.6–4.6)
LYMPHOCYTES NFR BLD AUTO: 19.4 %
MCH RBC QN AUTO: 29 PG (ref 27–31)
MCHC RBC AUTO-ENTMCNC: 33.5 MG/DL (ref 33–36)
MCV RBC AUTO: 86.5 FL (ref 80–94)
MONOCYTES # BLD AUTO: 1.01 X10(3)/MCL (ref 0.1–1.3)
MONOCYTES NFR BLD AUTO: 9.5 %
NEUTROPHILS # BLD AUTO: 7.2 X10(3)/MCL (ref 2.1–9.2)
NEUTROPHILS NFR BLD AUTO: 67.5 %
PLATELET # BLD AUTO: 240 X10(3)/MCL (ref 130–400)
PMV BLD AUTO: 10.5 FL (ref 7.4–10.4)
POTASSIUM SERPL-SCNC: 4.3 MMOL/L (ref 3.5–5.1)
PROT SERPL-MCNC: 7.8 GM/DL (ref 6.4–8.3)
RBC # BLD AUTO: 5.56 X10(6)/MCL (ref 4.7–6.1)
SODIUM SERPL-SCNC: 141 MMOL/L (ref 136–145)
WBC # SPEC AUTO: 10.6 X10(3)/MCL (ref 4.5–11.5)

## 2022-08-16 PROCEDURE — 36415 COLL VENOUS BLD VENIPUNCTURE: CPT | Performed by: NURSE PRACTITIONER

## 2022-08-16 PROCEDURE — 85025 COMPLETE CBC W/AUTO DIFF WBC: CPT | Performed by: NURSE PRACTITIONER

## 2022-08-16 PROCEDURE — 80074 ACUTE HEPATITIS PANEL: CPT | Performed by: NURSE PRACTITIONER

## 2022-08-16 PROCEDURE — S0030 INJECTION, METRONIDAZOLE: HCPCS | Performed by: NURSE PRACTITIONER

## 2022-08-16 PROCEDURE — 99284 EMERGENCY DEPT VISIT MOD MDM: CPT | Mod: 25

## 2022-08-16 PROCEDURE — 96374 THER/PROPH/DIAG INJ IV PUSH: CPT

## 2022-08-16 PROCEDURE — 25000003 PHARM REV CODE 250: Performed by: NURSE PRACTITIONER

## 2022-08-16 PROCEDURE — 80053 COMPREHEN METABOLIC PANEL: CPT | Performed by: NURSE PRACTITIONER

## 2022-08-16 RX ORDER — METRONIDAZOLE 500 MG/100ML
500 INJECTION, SOLUTION INTRAVENOUS
Status: COMPLETED | OUTPATIENT
Start: 2022-08-16 | End: 2022-08-16

## 2022-08-16 RX ORDER — METRONIDAZOLE 500 MG/1
500 TABLET ORAL EVERY 12 HOURS
Qty: 14 TABLET | Refills: 0 | Status: SHIPPED | OUTPATIENT
Start: 2022-08-16 | End: 2022-08-23

## 2022-08-16 RX ADMIN — METRONIDAZOLE 500 MG: 500 INJECTION, SOLUTION INTRAVENOUS at 01:08

## 2022-08-16 NOTE — ED PROVIDER NOTES
Encounter Date: 2022       History     Chief Complaint   Patient presents with    Diarrhea     Pt reports calling primary doctor and reports told to come to ED for blood work and possible IV fluids. Pt reports diarrhea x3 days, denies fever, chills, nausea yesterday. Pt denies diabetes, pt reports he has not been able to eat x3 days. Pt denies cough, congestion, or sore throat.      58 yo M c/o diarrhea x 3 days and unable to eat x 3 days. He says he ate part of a frozen etouffee 4 days ago that didn't taste right and his symptoms staretd the next day        Review of patient's allergies indicates:   Allergen Reactions    Ace inhibitors Swelling     Patient had unusual combination of systemic urticaria with swelling to upper lip - no intraoral swelling. The urticaria appeared to be a typical allergic response (with no anaphylactic symptoms. The swollen lip responded slightly to allergy meds but appeared exactly like ACE induced angioedema - believe it is safer to count this as an allergy (or adverse drug reaction than to assume a regular allergic reaction.     Atorvastatin      Other reaction(s): Joint pain    Diclofenac      Other reaction(s): Swelling    Ibuprofen      Other reaction(s): Facial Swelling    Ramipril      Other reaction(s): severe edema    Statins-hmg-coa reductase inhibitors      Other reaction(s): severe joint pain    Codeine Hives     Past Medical History:   Diagnosis Date    COPD (chronic obstructive pulmonary disease)     Coronary artery disease     Hypertension     Respiratory distress      Past Surgical History:   Procedure Laterality Date    CARDIAC SURGERY      CORONARY ANGIOPLASTY WITH STENT PLACEMENT      EXCISIONAL HEMORRHOIDECTOMY      FINGER SURGERY      HERNIA REPAIR       No family history on file.  Social History     Tobacco Use    Smoking status: Former Smoker     Types: Cigarettes     Quit date: 2016     Years since quittin.7    Smokeless tobacco:  Never Used   Substance Use Topics    Alcohol use: Yes     Comment: occasionally    Drug use: Yes     Types: Marijuana     Review of Systems   Gastrointestinal: Positive for diarrhea.        No appetite   All other systems reviewed and are negative.      Physical Exam     Initial Vitals [08/16/22 1113]   BP Pulse Resp Temp SpO2   (!) 157/97 82 18 98.2 °F (36.8 °C) 98 %      MAP       --         Physical Exam    Nursing note and vitals reviewed.  Constitutional: He appears well-developed and well-nourished.   HENT:   Head: Normocephalic and atraumatic.   Eyes: Conjunctivae are normal. Pupils are equal, round, and reactive to light.   Neck: Neck supple.   Normal range of motion.  Cardiovascular: Normal rate, regular rhythm and normal heart sounds.   Pulmonary/Chest: Breath sounds normal.   Abdominal: Abdomen is soft. Bowel sounds are normal.   Musculoskeletal:         General: Normal range of motion.      Cervical back: Normal range of motion and neck supple.     Neurological: He is alert and oriented to person, place, and time.   Skin: Skin is warm and dry. Capillary refill takes less than 2 seconds.   Psychiatric: He has a normal mood and affect.         ED Course   Procedures  Labs Reviewed   COMPREHENSIVE METABOLIC PANEL - Abnormal; Notable for the following components:       Result Value    Glucose Level 109 (*)     Creatinine 1.24 (*)     Bilirubin Total 2.6 (*)     All other components within normal limits   CBC WITH DIFFERENTIAL - Abnormal; Notable for the following components:    MPV 10.5 (*)     IG# 0.05 (*)     All other components within normal limits   CBC W/ AUTO DIFFERENTIAL    Narrative:     The following orders were created for panel order CBC auto differential.  Procedure                               Abnormality         Status                     ---------                               -----------         ------                     CBC with Differential[[id number]]        Abnormal            Final  result                 Please view results for these tests on the individual orders.   HEPATITIS PANEL, ACUTE          Imaging Results    None          Medications   metronidazole IVPB 500 mg (500 mg Intravenous New Bag 8/16/22 1328)                          Clinical Impression:   Final diagnoses:  [A09] Infectious diarrhea in adult patient (Primary)          ED Disposition Condition    Discharge Stable        ED Prescriptions     Medication Sig Dispense Start Date End Date Auth. Provider    metroNIDAZOLE (FLAGYL) 500 MG tablet Take 1 tablet (500 mg total) by mouth every 12 (twelve) hours. for 7 days 14 tablet 8/16/2022 8/23/2022 CARLY Jacobo        Follow-up Information     Follow up With Specialties Details Why Contact Info    Monica Paiz MD Family Medicine  As needed [address] [place] [id number]  [phone]             CARLY Jacobo  08/16/22 6923

## 2022-09-13 ENCOUNTER — LAB VISIT (OUTPATIENT)
Dept: LAB | Facility: HOSPITAL | Age: 57
End: 2022-09-13
Attending: FAMILY MEDICINE
Payer: MEDICAID

## 2022-09-13 DIAGNOSIS — M10.9 GOUT, UNSPECIFIED CAUSE, UNSPECIFIED CHRONICITY, UNSPECIFIED SITE: ICD-10-CM

## 2022-09-13 DIAGNOSIS — Z00.00 ROUTINE GENERAL MEDICAL EXAMINATION AT A HEALTH CARE FACILITY: Primary | ICD-10-CM

## 2022-09-13 LAB
ALBUMIN SERPL-MCNC: 4.3 GM/DL (ref 3.5–5)
ALBUMIN/GLOB SERPL: 1.2 RATIO (ref 1.1–2)
ALP SERPL-CCNC: 58 UNIT/L (ref 40–150)
ALT SERPL-CCNC: 32 UNIT/L (ref 0–55)
AST SERPL-CCNC: 18 UNIT/L (ref 5–34)
BILIRUBIN DIRECT+TOT PNL SERPL-MCNC: 1.6 MG/DL
BUN SERPL-MCNC: 10 MG/DL (ref 8.4–25.7)
CALCIUM SERPL-MCNC: 9.8 MG/DL (ref 8.4–10.2)
CHLORIDE SERPL-SCNC: 106 MMOL/L (ref 98–107)
CHOLEST SERPL-MCNC: 272 MG/DL
CHOLEST/HDLC SERPL: 7 {RATIO} (ref 0–5)
CO2 SERPL-SCNC: 28 MMOL/L (ref 22–29)
CREAT SERPL-MCNC: 0.99 MG/DL (ref 0.73–1.18)
GFR SERPLBLD CREATININE-BSD FMLA CKD-EPI: >60 MLS/MIN/1.73/M2
GLOBULIN SER-MCNC: 3.5 GM/DL (ref 2.4–3.5)
GLUCOSE SERPL-MCNC: 116 MG/DL (ref 74–100)
HDLC SERPL-MCNC: 39 MG/DL (ref 35–60)
LDLC SERPL CALC-MCNC: 202 MG/DL (ref 50–140)
POTASSIUM SERPL-SCNC: 4.6 MMOL/L (ref 3.5–5.1)
PROT SERPL-MCNC: 7.8 GM/DL (ref 6.4–8.3)
SODIUM SERPL-SCNC: 144 MMOL/L (ref 136–145)
TESTOST SERPL-MCNC: 334.86 NG/DL (ref 220.91–715.81)
TRIGL SERPL-MCNC: 157 MG/DL (ref 34–140)
URATE SERPL-MCNC: 7.9 MG/DL (ref 3.5–7.2)
VLDLC SERPL CALC-MCNC: 31 MG/DL

## 2022-09-13 PROCEDURE — 80053 COMPREHEN METABOLIC PANEL: CPT

## 2022-09-13 PROCEDURE — 36415 COLL VENOUS BLD VENIPUNCTURE: CPT

## 2022-09-13 PROCEDURE — 84403 ASSAY OF TOTAL TESTOSTERONE: CPT

## 2022-09-13 PROCEDURE — 84550 ASSAY OF BLOOD/URIC ACID: CPT

## 2022-09-13 PROCEDURE — 80061 LIPID PANEL: CPT
